# Patient Record
Sex: MALE | Race: OTHER | NOT HISPANIC OR LATINO
[De-identification: names, ages, dates, MRNs, and addresses within clinical notes are randomized per-mention and may not be internally consistent; named-entity substitution may affect disease eponyms.]

---

## 2017-02-13 ENCOUNTER — APPOINTMENT (OUTPATIENT)
Dept: CARDIOLOGY | Facility: CLINIC | Age: 77
End: 2017-02-13

## 2017-02-13 VITALS
DIASTOLIC BLOOD PRESSURE: 60 MMHG | SYSTOLIC BLOOD PRESSURE: 100 MMHG | BODY MASS INDEX: 28.88 KG/M2 | HEART RATE: 67 BPM | HEIGHT: 67 IN | WEIGHT: 184 LBS

## 2017-06-07 ENCOUNTER — APPOINTMENT (OUTPATIENT)
Dept: CARDIOLOGY | Facility: CLINIC | Age: 77
End: 2017-06-07

## 2017-08-28 ENCOUNTER — APPOINTMENT (OUTPATIENT)
Dept: CARDIOLOGY | Facility: CLINIC | Age: 77
End: 2017-08-28

## 2017-08-28 VITALS
DIASTOLIC BLOOD PRESSURE: 72 MMHG | HEART RATE: 67 BPM | HEIGHT: 67 IN | SYSTOLIC BLOOD PRESSURE: 122 MMHG | WEIGHT: 183 LBS | BODY MASS INDEX: 28.72 KG/M2

## 2017-10-27 ENCOUNTER — MEDICATION RENEWAL (OUTPATIENT)
Age: 77
End: 2017-10-27

## 2018-02-26 ENCOUNTER — APPOINTMENT (OUTPATIENT)
Dept: CARDIOLOGY | Facility: CLINIC | Age: 78
End: 2018-02-26

## 2018-02-26 VITALS
HEART RATE: 67 BPM | HEIGHT: 67 IN | SYSTOLIC BLOOD PRESSURE: 132 MMHG | DIASTOLIC BLOOD PRESSURE: 78 MMHG | BODY MASS INDEX: 29.82 KG/M2 | WEIGHT: 190 LBS

## 2018-02-26 RX ORDER — UBIDECARENONE/VIT E ACET 100MG-5
1000 CAPSULE ORAL DAILY
Refills: 0 | Status: ACTIVE | COMMUNITY

## 2018-08-27 ENCOUNTER — APPOINTMENT (OUTPATIENT)
Dept: CARDIOLOGY | Facility: CLINIC | Age: 78
End: 2018-08-27

## 2018-08-27 VITALS
DIASTOLIC BLOOD PRESSURE: 80 MMHG | BODY MASS INDEX: 28.88 KG/M2 | HEIGHT: 67 IN | HEART RATE: 67 BPM | SYSTOLIC BLOOD PRESSURE: 142 MMHG | WEIGHT: 184 LBS

## 2019-02-25 ENCOUNTER — APPOINTMENT (OUTPATIENT)
Dept: CARDIOLOGY | Facility: CLINIC | Age: 79
End: 2019-02-25

## 2019-02-25 VITALS
HEIGHT: 67 IN | WEIGHT: 189 LBS | HEART RATE: 66 BPM | BODY MASS INDEX: 29.66 KG/M2 | SYSTOLIC BLOOD PRESSURE: 138 MMHG | DIASTOLIC BLOOD PRESSURE: 84 MMHG

## 2019-02-25 NOTE — REASON FOR VISIT
[Follow-Up - Clinic] : a clinic follow-up of [Cardiomyopathy] : cardiomyopathy [Coronary Artery Disease] : coronary artery disease [CABG Follow-up] : bypass graft [Heart Failure] : congestive heart failure

## 2019-02-25 NOTE — REVIEW OF SYSTEMS
[Joint Pain] : no joint pain [Joint Swelling] : no joint swelling [Muscle Cramps] : no muscle cramps [Skin: A Rash] : no rash: [Dizziness] : no dizziness [Tremor] : no tremor was seen [Numbness (Hypesthesia)] : no numbness [Confusion] : no confusion was observed [Anxiety] : no anxiety [Excessive Thirst] : no polydipsia [Easy Bleeding] : no tendency for easy bleeding [Easy Bruising] : no tendency for easy bruising [Negative] : Genitourinary

## 2019-02-25 NOTE — PHYSICAL EXAM
[General Appearance - Well Developed] : well developed [Normal Appearance] : normal appearance [General Appearance - In No Acute Distress] : no acute distress [Normal Conjunctiva] : the conjunctiva exhibited no abnormalities [Normal Oral Mucosa] : normal oral mucosa [Normal Oropharynx] : normal oropharynx [Respiration, Rhythm And Depth] : normal respiratory rhythm and effort [Exaggerated Use Of Accessory Muscles For Inspiration] : no accessory muscle use [Auscultation Breath Sounds / Voice Sounds] : lungs were clear to auscultation bilaterally [Heart Rate And Rhythm] : heart rate and rhythm were normal [Heart Sounds] : normal S1 and S2 [Murmurs] : no murmurs present [FreeTextEntry1] : healed sternotomy scar [Abdomen Soft] : soft [Abdomen Tenderness] : non-tender [] : no hepato-splenomegaly [Abdomen Mass (___ Cm)] : no abdominal mass palpated [Abnormal Walk] : normal gait [Gait - Sufficient For Exercise Testing] : the gait was sufficient for exercise testing [Skin Color & Pigmentation] : normal skin color and pigmentation [Oriented To Time, Place, And Person] : oriented to person, place, and time [Affect] : the affect was normal [No Anxiety] : not feeling anxious

## 2019-02-25 NOTE — DISCUSSION/SUMMARY
[Coronary Artery Disease] : coronary artery disease [None] : none [Systolic Heart Failure] : systolic congestive heart failure [Stable] : stable [Sodium Restriction] : sodium restriction [Patient] : the patient [Minutes spent___] : for [unfilled] ~Uminutes [___ Month(s)] : [unfilled] month(s) [With Me] : with me [FreeTextEntry1] : C/w current medical therapy.\par The disease state discussed.\par The use of AICD was discussed, the preventive nature of the device discussed in detail.\par The patient is still not interested in the AICD at this time. He understands the risks of VT.\par C/w b-blocker and ARB.\par Labs noted.\par Refills given.\par F/u in 6 months.

## 2019-02-25 NOTE — HISTORY OF PRESENT ILLNESS
[FreeTextEntry1] : Patient presents for follow-up. 78 y/o male with CAD. S/p CABG. No chest pain or dyspnea. Clinically is doing well. No palpitations. No syncope, orthopnea or PND. No edema. The patient had an echocardiogram and the EF is still at 30-35% range. Not interested in AICD. Compliant with medications, feels good overall. Occasional black and blues from ASA. Tolerating Lipitor.

## 2019-02-25 NOTE — ASSESSMENT
[FreeTextEntry1] : CAD, s/p CABG at [place].\par No angina. Doing well clinically with medical therapy.\par CHF - chronic systolic dysfunction. EF 30-35%\par Clinically stable on current medical therapy.\par C/w Lipitor 40 mg QD. LDL well controlled.\par Not interested in AICD evaluation.\par Patient will re-check labs with the PMD.\par Diet and exercise discussed.\par \par Return in 6 months.\par

## 2019-08-26 ENCOUNTER — RX RENEWAL (OUTPATIENT)
Age: 79
End: 2019-08-26

## 2019-08-26 ENCOUNTER — APPOINTMENT (OUTPATIENT)
Dept: CARDIOLOGY | Facility: CLINIC | Age: 79
End: 2019-08-26
Payer: COMMERCIAL

## 2019-08-26 VITALS
DIASTOLIC BLOOD PRESSURE: 78 MMHG | HEART RATE: 67 BPM | HEIGHT: 67 IN | WEIGHT: 183 LBS | BODY MASS INDEX: 28.72 KG/M2 | SYSTOLIC BLOOD PRESSURE: 140 MMHG

## 2019-08-26 PROCEDURE — 93000 ELECTROCARDIOGRAM COMPLETE: CPT

## 2019-08-26 PROCEDURE — 99213 OFFICE O/P EST LOW 20 MIN: CPT

## 2019-08-26 NOTE — PHYSICAL EXAM
[General Appearance - Well Developed] : well developed [Normal Appearance] : normal appearance [General Appearance - In No Acute Distress] : no acute distress [Normal Conjunctiva] : the conjunctiva exhibited no abnormalities [Normal Oral Mucosa] : normal oral mucosa [Normal Oropharynx] : normal oropharynx [Respiration, Rhythm And Depth] : normal respiratory rhythm and effort [Exaggerated Use Of Accessory Muscles For Inspiration] : no accessory muscle use [Auscultation Breath Sounds / Voice Sounds] : lungs were clear to auscultation bilaterally [Heart Rate And Rhythm] : heart rate and rhythm were normal [Heart Sounds] : normal S1 and S2 [Murmurs] : no murmurs present [Abdomen Soft] : soft [Abdomen Tenderness] : non-tender [] : no hepato-splenomegaly [Abdomen Mass (___ Cm)] : no abdominal mass palpated [Gait - Sufficient For Exercise Testing] : the gait was sufficient for exercise testing [Abnormal Walk] : normal gait [Nail Clubbing] : no clubbing of the fingernails [Cyanosis, Localized] : no localized cyanosis [Skin Color & Pigmentation] : normal skin color and pigmentation [Oriented To Time, Place, And Person] : oriented to person, place, and time [Affect] : the affect was normal [No Anxiety] : not feeling anxious [FreeTextEntry1] : healed sternotomy scar

## 2019-08-26 NOTE — REVIEW OF SYSTEMS
[Negative] : Genitourinary [Joint Pain] : no joint pain [Joint Swelling] : no joint swelling [Muscle Cramps] : no muscle cramps [Skin: A Rash] : no rash: [Dizziness] : no dizziness [Tremor] : no tremor was seen [Numbness (Hypesthesia)] : no numbness [Confusion] : no confusion was observed [Anxiety] : no anxiety [Excessive Thirst] : no polydipsia [Easy Bleeding] : no tendency for easy bleeding [Easy Bruising] : no tendency for easy bruising

## 2019-08-26 NOTE — DISCUSSION/SUMMARY
[Coronary Artery Disease] : coronary artery disease [None] : none [Systolic Heart Failure] : systolic congestive heart failure [Stable] : stable [Sodium Restriction] : sodium restriction [Minutes spent___] : for [unfilled] ~Uminutes [Patient] : the patient [___ Month(s)] : [unfilled] month(s) [With Me] : with me [FreeTextEntry1] : C/w current medical therapy.\par The disease state discussed.\par The use of AICD was discussed, the preventive nature of the device discussed in detail.\par The patient is still not interested in the AICD at this time. He understands the risks of VT.\par C/w b-blocker and ARB.\par Labs noted.\par Refills given.\par F/u in 6 months.

## 2019-08-26 NOTE — ASSESSMENT
[FreeTextEntry1] : CAD, s/p CABG at [place].\par No angina. Doing well clinically with medical therapy.\par CHF - chronic systolic dysfunction. EF 30-35%\par Clinically stable on current medical therapy.\par C/w Lipitor 40 mg QD. LDL well controlled.\par Not interested in AICD evaluation.\par Patient will re-check labs with the PMD.\par Diet and exercise discussed.\par \par Return in 6 months.\par

## 2019-08-26 NOTE — REASON FOR VISIT
[Follow-Up - Clinic] : a clinic follow-up of [Coronary Artery Disease] : coronary artery disease [Cardiomyopathy] : cardiomyopathy [CABG Follow-up] : bypass graft [Heart Failure] : congestive heart failure

## 2020-02-24 ENCOUNTER — APPOINTMENT (OUTPATIENT)
Dept: CARDIOLOGY | Facility: CLINIC | Age: 80
End: 2020-02-24
Payer: COMMERCIAL

## 2020-02-24 VITALS
WEIGHT: 185 LBS | HEIGHT: 67 IN | SYSTOLIC BLOOD PRESSURE: 140 MMHG | DIASTOLIC BLOOD PRESSURE: 72 MMHG | BODY MASS INDEX: 29.03 KG/M2 | HEART RATE: 69 BPM

## 2020-02-24 PROCEDURE — 99213 OFFICE O/P EST LOW 20 MIN: CPT

## 2020-02-24 PROCEDURE — 93000 ELECTROCARDIOGRAM COMPLETE: CPT

## 2020-02-24 NOTE — HISTORY OF PRESENT ILLNESS
[FreeTextEntry1] : Patient presents for follow-up. 79 y/o male with CAD. S/p CABG. No chest pain or dyspnea. Clinically is doing well. No palpitations. No syncope, orthopnea or PND. No edema. Last echocardiogram - the EF is still at 30-35% range. Not interested in AICD. Compliant with medications, feels good overall. Occasional black and blue marks from ASA. Tolerating Lipitor. Labs done with PMD - reviewed today.

## 2020-02-24 NOTE — DISCUSSION/SUMMARY
[Coronary Artery Disease] : coronary artery disease [Systolic Heart Failure] : systolic congestive heart failure [None] : none [Stable] : stable [Sodium Restriction] : sodium restriction [Minutes spent___] : for [unfilled] ~Uminutes [Patient] : the patient [___ Month(s)] : [unfilled] month(s) [With Me] : with me [FreeTextEntry1] : C/w current medical therapy.\par The disease state discussed.\par The use of AICD was discussed, the preventive nature of the device discussed in detail.\par The patient is still not interested in the AICD at this time. He understands the risks of VT.\par C/w b-blocker and ARB.\par Labs noted.\par Refills given.\par F/u in 6 months.

## 2020-02-24 NOTE — ASSESSMENT
[FreeTextEntry1] : CAD, s/p CABG at [place].\par No angina. Doing well clinically with medical therapy.\par CHF - chronic systolic dysfunction. EF 30-35%\par Clinically stable on current medical therapy.\par C/w Lipitor 40 mg QD. LDL well controlled.\par BP is borderline - he reports its normal at home.\par Not interested in AICD evaluation.\par Labs reviewed with the patient. LDL well controlled. Elevated Ferritin - f/u with the PMD.\par Diet and exercise discussed.\par \par Return in 6 months.\par

## 2020-02-24 NOTE — PHYSICAL EXAM
[General Appearance - Well Developed] : well developed [Normal Appearance] : normal appearance [General Appearance - In No Acute Distress] : no acute distress [Normal Conjunctiva] : the conjunctiva exhibited no abnormalities [Normal Oral Mucosa] : normal oral mucosa [Normal Oropharynx] : normal oropharynx [Respiration, Rhythm And Depth] : normal respiratory rhythm and effort [Exaggerated Use Of Accessory Muscles For Inspiration] : no accessory muscle use [Auscultation Breath Sounds / Voice Sounds] : lungs were clear to auscultation bilaterally [Heart Rate And Rhythm] : heart rate and rhythm were normal [Heart Sounds] : normal S1 and S2 [Murmurs] : no murmurs present [FreeTextEntry1] : healed sternotomy scar [Abdomen Soft] : soft [Abdomen Tenderness] : non-tender [] : no hepato-splenomegaly [Abnormal Walk] : normal gait [Abdomen Mass (___ Cm)] : no abdominal mass palpated [Gait - Sufficient For Exercise Testing] : the gait was sufficient for exercise testing [Nail Clubbing] : no clubbing of the fingernails [Cyanosis, Localized] : no localized cyanosis [Skin Color & Pigmentation] : normal skin color and pigmentation [Oriented To Time, Place, And Person] : oriented to person, place, and time [Affect] : the affect was normal [No Anxiety] : not feeling anxious

## 2020-08-24 ENCOUNTER — APPOINTMENT (OUTPATIENT)
Dept: CARDIOLOGY | Facility: CLINIC | Age: 80
End: 2020-08-24
Payer: COMMERCIAL

## 2020-08-24 PROCEDURE — 99441: CPT | Mod: 95

## 2021-02-22 ENCOUNTER — RESULT CHARGE (OUTPATIENT)
Age: 81
End: 2021-02-22

## 2021-02-22 ENCOUNTER — APPOINTMENT (OUTPATIENT)
Dept: CARDIOLOGY | Facility: CLINIC | Age: 81
End: 2021-02-22
Payer: COMMERCIAL

## 2021-02-22 VITALS
WEIGHT: 182 LBS | HEART RATE: 68 BPM | HEIGHT: 67 IN | TEMPERATURE: 97.2 F | BODY MASS INDEX: 28.56 KG/M2 | SYSTOLIC BLOOD PRESSURE: 140 MMHG | DIASTOLIC BLOOD PRESSURE: 70 MMHG

## 2021-02-22 PROCEDURE — 99072 ADDL SUPL MATRL&STAF TM PHE: CPT

## 2021-02-22 PROCEDURE — 99213 OFFICE O/P EST LOW 20 MIN: CPT

## 2021-02-22 PROCEDURE — 93000 ELECTROCARDIOGRAM COMPLETE: CPT

## 2021-02-22 NOTE — HISTORY OF PRESENT ILLNESS
[FreeTextEntry1] : Patient presents for follow-up. 82 y/o male with CAD. S/p CABG. No chest pain or dyspnea. Clinically is doing well. No palpitations. No syncope, orthopnea or PND. No edema. Last echocardiogram - the EF is still at 30-35% range. Not interested in AICD. Compliant with medications, feels good overall. Tolerating Lipitor. Labs done with PMD - reviewed today. Lipids under control. Glucose elevated at 121, but A1c was normal. BP is normal.

## 2021-02-22 NOTE — ASSESSMENT
[FreeTextEntry1] : CAD, s/p CABG at [place].\par No angina. Doing well clinically with medical therapy.\par CHF - chronic systolic dysfunction. EF 30-35%\par Clinically stable on current medical therapy.\par C/w Lipitor 40 mg QD. LDL well controlled.\par BP is normal at home.\par Not interested in AICD evaluation.\par Labs reviewed with the patient. LDL well controlled. Elevated Ferritin - f/u with the PMD.\par Diet and exercise discussed.\par \par Return in 6 months.\par

## 2021-02-22 NOTE — PHYSICAL EXAM
[General Appearance - Well Developed] : well developed [Normal Appearance] : normal appearance [General Appearance - In No Acute Distress] : no acute distress [Normal Conjunctiva] : the conjunctiva exhibited no abnormalities [Respiration, Rhythm And Depth] : normal respiratory rhythm and effort [Exaggerated Use Of Accessory Muscles For Inspiration] : no accessory muscle use [Auscultation Breath Sounds / Voice Sounds] : lungs were clear to auscultation bilaterally [Heart Rate And Rhythm] : heart rate and rhythm were normal [Heart Sounds] : normal S1 and S2 [Murmurs] : no murmurs present [FreeTextEntry1] : healed sternotomy scar [Abdomen Soft] : soft [Abdomen Tenderness] : non-tender [] : no hepato-splenomegaly [Abdomen Mass (___ Cm)] : no abdominal mass palpated [Abnormal Walk] : normal gait [Gait - Sufficient For Exercise Testing] : the gait was sufficient for exercise testing [Nail Clubbing] : no clubbing of the fingernails [Cyanosis, Localized] : no localized cyanosis [Skin Color & Pigmentation] : normal skin color and pigmentation [Oriented To Time, Place, And Person] : oriented to person, place, and time [Affect] : the affect was normal [No Anxiety] : not feeling anxious

## 2021-07-12 ENCOUNTER — RX RENEWAL (OUTPATIENT)
Age: 81
End: 2021-07-12

## 2021-08-30 ENCOUNTER — APPOINTMENT (OUTPATIENT)
Dept: CARDIOLOGY | Facility: CLINIC | Age: 81
End: 2021-08-30
Payer: COMMERCIAL

## 2021-08-30 VITALS
SYSTOLIC BLOOD PRESSURE: 144 MMHG | BODY MASS INDEX: 29.03 KG/M2 | TEMPERATURE: 96.8 F | HEART RATE: 66 BPM | HEIGHT: 67 IN | DIASTOLIC BLOOD PRESSURE: 82 MMHG | WEIGHT: 185 LBS

## 2021-08-30 PROCEDURE — 93000 ELECTROCARDIOGRAM COMPLETE: CPT

## 2021-08-30 PROCEDURE — 99213 OFFICE O/P EST LOW 20 MIN: CPT

## 2021-08-30 NOTE — ASSESSMENT
[FreeTextEntry1] : CAD, s/p CABG at [place].\par No angina. Doing well clinically with medical therapy.\par CHF - chronic systolic dysfunction. EF 30-35%\par Clinically stable on current medical therapy.\par C/w Lipitor 40 mg QD. LDL well controlled.\par BP is normal at home.\par Not interested in AICD evaluation.\par f/u with the PMD.\par Diet and exercise discussed.\par \par Return in 6 months.\par

## 2021-08-30 NOTE — PHYSICAL EXAM
[General Appearance - Well Developed] : well developed [Normal Appearance] : normal appearance [General Appearance - In No Acute Distress] : no acute distress [Normal Conjunctiva] : the conjunctiva exhibited no abnormalities [Exaggerated Use Of Accessory Muscles For Inspiration] : no accessory muscle use [Respiration, Rhythm And Depth] : normal respiratory rhythm and effort [Auscultation Breath Sounds / Voice Sounds] : lungs were clear to auscultation bilaterally [Heart Rate And Rhythm] : heart rate and rhythm were normal [Heart Sounds] : normal S1 and S2 [Murmurs] : no murmurs present [FreeTextEntry1] : healed sternotomy scar [Abdomen Tenderness] : non-tender [Abdomen Soft] : soft [] : no hepato-splenomegaly [Abdomen Mass (___ Cm)] : no abdominal mass palpated [Abnormal Walk] : normal gait [Gait - Sufficient For Exercise Testing] : the gait was sufficient for exercise testing [Nail Clubbing] : no clubbing of the fingernails [Cyanosis, Localized] : no localized cyanosis [Skin Color & Pigmentation] : normal skin color and pigmentation [Oriented To Time, Place, And Person] : oriented to person, place, and time [Affect] : the affect was normal [No Anxiety] : not feeling anxious

## 2021-08-30 NOTE — DISCUSSION/SUMMARY
[Systolic Heart Failure] : systolic heart failure [Coronary Artery Disease] : coronary artery disease [Stable] : stable

## 2021-08-30 NOTE — HISTORY OF PRESENT ILLNESS
[FreeTextEntry1] : Patient presents for follow-up. 80 y/o male with CAD. S/p CABG. No chest pain or dyspnea. Clinically is doing well. No palpitations. No syncope, orthopnea or PND. No edema. Last echocardiogram - the EF is still at 30-35% range. Not interested in AICD. Compliant with medications, feels good overall. Tolerating Lipitor.

## 2021-10-11 ENCOUNTER — RX RENEWAL (OUTPATIENT)
Age: 81
End: 2021-10-11

## 2022-02-28 ENCOUNTER — APPOINTMENT (OUTPATIENT)
Dept: CARDIOLOGY | Facility: CLINIC | Age: 82
End: 2022-02-28
Payer: COMMERCIAL

## 2022-02-28 VITALS
HEART RATE: 73 BPM | HEIGHT: 67 IN | BODY MASS INDEX: 29.03 KG/M2 | DIASTOLIC BLOOD PRESSURE: 82 MMHG | SYSTOLIC BLOOD PRESSURE: 132 MMHG | WEIGHT: 185 LBS | TEMPERATURE: 97.1 F

## 2022-02-28 PROCEDURE — 99213 OFFICE O/P EST LOW 20 MIN: CPT

## 2022-02-28 PROCEDURE — 93000 ELECTROCARDIOGRAM COMPLETE: CPT

## 2022-02-28 NOTE — PHYSICAL EXAM
[General Appearance - Well Developed] : well developed [Normal Appearance] : normal appearance [General Appearance - In No Acute Distress] : no acute distress [Normal Conjunctiva] : the conjunctiva exhibited no abnormalities [Respiration, Rhythm And Depth] : normal respiratory rhythm and effort [Exaggerated Use Of Accessory Muscles For Inspiration] : no accessory muscle use [Auscultation Breath Sounds / Voice Sounds] : lungs were clear to auscultation bilaterally [Heart Rate And Rhythm] : heart rate and rhythm were normal [Heart Sounds] : normal S1 and S2 [Murmurs] : no murmurs present [Abdomen Soft] : soft [Abdomen Tenderness] : non-tender [] : no hepato-splenomegaly [Abdomen Mass (___ Cm)] : no abdominal mass palpated [Abnormal Walk] : normal gait [Gait - Sufficient For Exercise Testing] : the gait was sufficient for exercise testing [Nail Clubbing] : no clubbing of the fingernails [Cyanosis, Localized] : no localized cyanosis [Skin Color & Pigmentation] : normal skin color and pigmentation [Oriented To Time, Place, And Person] : oriented to person, place, and time [Affect] : the affect was normal [No Anxiety] : not feeling anxious [FreeTextEntry1] : healed sternotomy scar

## 2022-02-28 NOTE — ASSESSMENT
[FreeTextEntry1] : CAD, s/p CABG at [place].\par No angina. Doing well clinically with medical therapy.\par CHF - chronic systolic dysfunction. EF 30-35%\par Clinically stable on current medical therapy.\par C/w Lipitor 40 mg QD. LDL well controlled.\par BP is normal at home.\par Not interested in AICD evaluation.\par f/u with the PMD.\par Diet and exercise discussed.\par \par Return in 6 months.\par

## 2022-02-28 NOTE — HISTORY OF PRESENT ILLNESS
[FreeTextEntry1] : Patient presents for follow-up. 81 y/o male with CAD. S/p CABG. No chest pain or dyspnea. Clinically is doing well. No palpitations. No syncope, orthopnea or PND. No edema. Last echocardiogram - the EF is still at 30-35% range. Not interested in AICD. Compliant with medications, feels good overall. Tolerating Lipitor. Chol 94 TRI 82 LDL 43

## 2022-09-26 ENCOUNTER — APPOINTMENT (OUTPATIENT)
Dept: CARDIOLOGY | Facility: CLINIC | Age: 82
End: 2022-09-26

## 2022-09-26 VITALS
TEMPERATURE: 97.5 F | DIASTOLIC BLOOD PRESSURE: 80 MMHG | WEIGHT: 178 LBS | HEIGHT: 67 IN | BODY MASS INDEX: 27.94 KG/M2 | SYSTOLIC BLOOD PRESSURE: 138 MMHG | HEART RATE: 67 BPM

## 2022-09-26 PROCEDURE — 93000 ELECTROCARDIOGRAM COMPLETE: CPT

## 2022-09-26 PROCEDURE — 99213 OFFICE O/P EST LOW 20 MIN: CPT | Mod: 25

## 2022-09-26 NOTE — ASSESSMENT
[FreeTextEntry1] : CAD, s/p CABG at [place].\par No angina. Doing well clinically with medical therapy.\par CHF - chronic systolic dysfunction. EF 30-35%\par Clinically stable on current medical therapy.\par C/w Lipitor 40 mg QD. LDL well controlled.\par BP is normal at home.\par Not interested in AICD evaluation.\par f/u with the PMD.\par Diet and exercise discussed.\par \par Return in 6 months.\par

## 2022-09-26 NOTE — HISTORY OF PRESENT ILLNESS
[FreeTextEntry1] : Patient presents for follow-up. 83 y/o male with CAD. S/p CABG. No chest pain or dyspnea. Clinically is doing well. No palpitations. No syncope, orthopnea or PND. No edema. Last echocardiogram - the EF is still at 30-35% range. Not interested in AICD. Compliant with medications, feels good overall. Tolerating Lipitor.

## 2023-03-27 ENCOUNTER — RESULT CHARGE (OUTPATIENT)
Age: 83
End: 2023-03-27

## 2023-03-27 ENCOUNTER — APPOINTMENT (OUTPATIENT)
Dept: CARDIOLOGY | Facility: CLINIC | Age: 83
End: 2023-03-27
Payer: COMMERCIAL

## 2023-03-27 VITALS
SYSTOLIC BLOOD PRESSURE: 130 MMHG | HEART RATE: 62 BPM | DIASTOLIC BLOOD PRESSURE: 70 MMHG | WEIGHT: 183 LBS | HEIGHT: 67 IN | BODY MASS INDEX: 28.72 KG/M2

## 2023-03-27 PROCEDURE — 93000 ELECTROCARDIOGRAM COMPLETE: CPT

## 2023-03-27 PROCEDURE — 99214 OFFICE O/P EST MOD 30 MIN: CPT | Mod: 25

## 2023-03-27 NOTE — ASSESSMENT
[FreeTextEntry1] : CAD, s/p CABG at [place].\par No angina. Doing well clinically with medical therapy.\par CHF - chronic systolic dysfunction. EF 30-35%\par Clinically stable on current medical therapy.\par C/w Lipitor 40 mg QD. LDL well controlled.\par BP is normal at home.\par Not interested in AICD evaluation. Discussed again.\par f/u with the PMD.\par CKD - stable. Low salt diet discussed.\par Diet and exercise discussed.\par \par Return in 6 months.\par

## 2023-03-27 NOTE — HISTORY OF PRESENT ILLNESS
[FreeTextEntry1] : Pt is 82 y/o male with  PMH of HTN, CAD. S/p CABG.  Pt is seen for f.u.  Denies chest pain, no SOB.  Clinically is doing well. No palpitations. No syncope, orthopnea or PND. No edema. Last echocardiogram - the EF is still at 30-35% range. Not interested in AICD. Compliant with medications, feels good overall. Tolerating Lipitor. \par 02/23/23 Chol 105 Trig 95 LDL 51

## 2023-04-03 ENCOUNTER — RX RENEWAL (OUTPATIENT)
Age: 83
End: 2023-04-03

## 2023-09-18 ENCOUNTER — RX RENEWAL (OUTPATIENT)
Age: 83
End: 2023-09-18

## 2023-09-19 ENCOUNTER — APPOINTMENT (OUTPATIENT)
Dept: CARDIOLOGY | Facility: CLINIC | Age: 83
End: 2023-09-19
Payer: COMMERCIAL

## 2023-09-19 ENCOUNTER — RESULT CHARGE (OUTPATIENT)
Age: 83
End: 2023-09-19

## 2023-09-19 VITALS
DIASTOLIC BLOOD PRESSURE: 78 MMHG | WEIGHT: 170 LBS | HEART RATE: 73 BPM | HEIGHT: 67 IN | BODY MASS INDEX: 26.68 KG/M2 | SYSTOLIC BLOOD PRESSURE: 124 MMHG

## 2023-09-19 DIAGNOSIS — I25.9 CHRONIC ISCHEMIC HEART DISEASE, UNSPECIFIED: ICD-10-CM

## 2023-09-19 PROCEDURE — 99214 OFFICE O/P EST MOD 30 MIN: CPT | Mod: 25

## 2023-09-19 PROCEDURE — 93000 ELECTROCARDIOGRAM COMPLETE: CPT

## 2024-03-25 ENCOUNTER — APPOINTMENT (OUTPATIENT)
Dept: CARDIOLOGY | Facility: CLINIC | Age: 84
End: 2024-03-25
Payer: COMMERCIAL

## 2024-03-25 ENCOUNTER — RESULT CHARGE (OUTPATIENT)
Age: 84
End: 2024-03-25

## 2024-03-25 VITALS
WEIGHT: 170 LBS | SYSTOLIC BLOOD PRESSURE: 138 MMHG | HEIGHT: 67 IN | BODY MASS INDEX: 26.68 KG/M2 | DIASTOLIC BLOOD PRESSURE: 74 MMHG | HEART RATE: 73 BPM

## 2024-03-25 DIAGNOSIS — I10 ESSENTIAL (PRIMARY) HYPERTENSION: ICD-10-CM

## 2024-03-25 DIAGNOSIS — I25.10 ATHEROSCLEROTIC HEART DISEASE OF NATIVE CORONARY ARTERY W/OUT ANGINA PECTORIS: ICD-10-CM

## 2024-03-25 DIAGNOSIS — I42.9 CARDIOMYOPATHY, UNSPECIFIED: ICD-10-CM

## 2024-03-25 DIAGNOSIS — E78.5 HYPERLIPIDEMIA, UNSPECIFIED: ICD-10-CM

## 2024-03-25 PROCEDURE — 99214 OFFICE O/P EST MOD 30 MIN: CPT | Mod: 25

## 2024-03-25 PROCEDURE — 93000 ELECTROCARDIOGRAM COMPLETE: CPT

## 2024-03-25 PROCEDURE — G2211 COMPLEX E/M VISIT ADD ON: CPT | Mod: NC,1L

## 2024-03-25 NOTE — HISTORY OF PRESENT ILLNESS
[FreeTextEntry1] : Pt is 85 y/o male with  PMH of HTN, CAD. S/p CABG.  Pt is seen for f.u.  Denies chest pain, no SOB.  Clinically is doing well. No palpitations. No syncope, orthopnea or PND. No edema. Last echocardiogram - the EF is still at 30-35% range. Not interested in AICD. Compliant with medications, feels good overall. Tolerating Lipitor.   No recent labs  02/23/23 Chol 105 Trig 95 LDL 51. Main complaints relate to joint pains.

## 2024-03-25 NOTE — REASON FOR VISIT
[Coronary Artery Disease] : coronary artery disease [CV Risk Factors and Non-Cardiac Disease] : CV risk factors and non-cardiac disease

## 2024-03-25 NOTE — PHYSICAL EXAM
[General Appearance - Well Developed] : well developed [Normal Appearance] : normal appearance [General Appearance - In No Acute Distress] : no acute distress [Normal Conjunctiva] : the conjunctiva exhibited no abnormalities [Respiration, Rhythm And Depth] : normal respiratory rhythm and effort [Exaggerated Use Of Accessory Muscles For Inspiration] : no accessory muscle use [Auscultation Breath Sounds / Voice Sounds] : lungs were clear to auscultation bilaterally [Heart Rate And Rhythm] : heart rate and rhythm were normal [Heart Sounds] : normal S1 and S2 [Murmurs] : no murmurs present [Abdomen Soft] : soft [] : no hepato-splenomegaly [Abdomen Tenderness] : non-tender [Abdomen Mass (___ Cm)] : no abdominal mass palpated [Abnormal Walk] : normal gait [Gait - Sufficient For Exercise Testing] : the gait was sufficient for exercise testing [Nail Clubbing] : no clubbing of the fingernails [Cyanosis, Localized] : no localized cyanosis [Skin Color & Pigmentation] : normal skin color and pigmentation [Oriented To Time, Place, And Person] : oriented to person, place, and time [Affect] : the affect was normal [No Anxiety] : not feeling anxious [FreeTextEntry1] : healed sternotomy scar

## 2024-03-25 NOTE — ASSESSMENT
[FreeTextEntry1] : CAD, s/p CABG at [place]. No angina. Doing well clinically with medical therapy. CHF - chronic systolic dysfunction. EF 30-35% Clinically stable on current medical therapy. C/w Lipitor 40 mg QD. LDL well controlled. BP is normal at home. Not interested in AICD evaluation. Discussed again. LBBB - stable f/u with the PMD. Consider rheumatology eval. He is not interested. CKD - stable. Low salt diet discussed. Arthritis - f/u with PMD. Needs refills. Diet and exercise discussed.  Return in 6 months.

## 2024-04-09 ENCOUNTER — RX RENEWAL (OUTPATIENT)
Age: 84
End: 2024-04-09

## 2024-04-09 RX ORDER — ATORVASTATIN CALCIUM 40 MG/1
40 TABLET, FILM COATED ORAL
Qty: 90 | Refills: 3 | Status: ACTIVE | COMMUNITY
Start: 2018-02-26 | End: 1900-01-01

## 2024-04-11 NOTE — HISTORY OF PRESENT ILLNESS
[FreeTextEntry1] : Patient presents for follow-up. 78 y/o male with CAD. S/p CABG. No chest pain or dyspnea. Clinically is doing well. No palpitations. No syncope, orthopnea or PND. No edema. Last echocardiogram - the EF is still at 30-35% range. Not interested in AICD. Compliant with medications, feels good overall. Occasional black and blue marks from ASA. Tolerating Lipitor. Labs pending with PMD. none

## 2024-06-10 ENCOUNTER — RX RENEWAL (OUTPATIENT)
Age: 84
End: 2024-06-10

## 2024-09-23 ENCOUNTER — RESULT CHARGE (OUTPATIENT)
Age: 84
End: 2024-09-23

## 2024-09-23 ENCOUNTER — APPOINTMENT (OUTPATIENT)
Dept: CARDIOLOGY | Facility: CLINIC | Age: 84
End: 2024-09-23
Payer: COMMERCIAL

## 2024-09-23 VITALS
OXYGEN SATURATION: 98 % | WEIGHT: 168 LBS | SYSTOLIC BLOOD PRESSURE: 132 MMHG | BODY MASS INDEX: 26.37 KG/M2 | DIASTOLIC BLOOD PRESSURE: 76 MMHG | HEART RATE: 67 BPM | HEIGHT: 67 IN

## 2024-09-23 DIAGNOSIS — I25.10 ATHEROSCLEROTIC HEART DISEASE OF NATIVE CORONARY ARTERY W/OUT ANGINA PECTORIS: ICD-10-CM

## 2024-09-23 DIAGNOSIS — E78.5 HYPERLIPIDEMIA, UNSPECIFIED: ICD-10-CM

## 2024-09-23 DIAGNOSIS — I25.9 CHRONIC ISCHEMIC HEART DISEASE, UNSPECIFIED: ICD-10-CM

## 2024-09-23 DIAGNOSIS — I42.9 CARDIOMYOPATHY, UNSPECIFIED: ICD-10-CM

## 2024-09-23 DIAGNOSIS — I10 ESSENTIAL (PRIMARY) HYPERTENSION: ICD-10-CM

## 2024-09-23 PROCEDURE — G2211 COMPLEX E/M VISIT ADD ON: CPT | Mod: NC

## 2024-09-23 PROCEDURE — 93000 ELECTROCARDIOGRAM COMPLETE: CPT

## 2024-09-23 PROCEDURE — 99214 OFFICE O/P EST MOD 30 MIN: CPT

## 2024-09-23 NOTE — ASSESSMENT
[FreeTextEntry1] : CAD, s/p CABG at [place]. No angina. Doing well clinically with medical therapy. CHF - chronic systolic dysfunction. EF 30-35% Clinically stable on current medical therapy. C/w Lipitor 40 mg QD. LDL well controlled. BP is normal at home. Not interested in AICD evaluation. Discussed again. LBBB - stable f/u with the PMD. Consider rheumatology eval. He is not interested. CKD - stable. Low salt diet discussed. Arthritis - f/u with PMD. Needs refills. Diet and exercise discussed.  Return in 6 months.

## 2024-10-07 ENCOUNTER — RX RENEWAL (OUTPATIENT)
Age: 84
End: 2024-10-07

## 2025-02-13 ENCOUNTER — RX RENEWAL (OUTPATIENT)
Age: 85
End: 2025-02-13

## 2025-03-31 ENCOUNTER — APPOINTMENT (OUTPATIENT)
Dept: CARDIOLOGY | Facility: CLINIC | Age: 85
End: 2025-03-31
Payer: COMMERCIAL

## 2025-03-31 ENCOUNTER — NON-APPOINTMENT (OUTPATIENT)
Age: 85
End: 2025-03-31

## 2025-03-31 ENCOUNTER — RESULT CHARGE (OUTPATIENT)
Age: 85
End: 2025-03-31

## 2025-03-31 VITALS
SYSTOLIC BLOOD PRESSURE: 140 MMHG | HEIGHT: 67 IN | BODY MASS INDEX: 26.68 KG/M2 | HEART RATE: 70 BPM | DIASTOLIC BLOOD PRESSURE: 70 MMHG | WEIGHT: 170 LBS

## 2025-03-31 DIAGNOSIS — I10 ESSENTIAL (PRIMARY) HYPERTENSION: ICD-10-CM

## 2025-03-31 DIAGNOSIS — I25.10 ATHEROSCLEROTIC HEART DISEASE OF NATIVE CORONARY ARTERY W/OUT ANGINA PECTORIS: ICD-10-CM

## 2025-03-31 DIAGNOSIS — I42.9 CARDIOMYOPATHY, UNSPECIFIED: ICD-10-CM

## 2025-03-31 DIAGNOSIS — I25.9 CHRONIC ISCHEMIC HEART DISEASE, UNSPECIFIED: ICD-10-CM

## 2025-03-31 DIAGNOSIS — E78.5 HYPERLIPIDEMIA, UNSPECIFIED: ICD-10-CM

## 2025-03-31 PROCEDURE — 93000 ELECTROCARDIOGRAM COMPLETE: CPT

## 2025-03-31 PROCEDURE — 99213 OFFICE O/P EST LOW 20 MIN: CPT

## 2025-03-31 PROCEDURE — G2211 COMPLEX E/M VISIT ADD ON: CPT | Mod: NC

## 2025-06-17 ENCOUNTER — RX RENEWAL (OUTPATIENT)
Age: 85
End: 2025-06-17

## 2025-06-28 ENCOUNTER — NON-APPOINTMENT (OUTPATIENT)
Age: 85
End: 2025-06-28